# Patient Record
Sex: FEMALE | Race: WHITE
[De-identification: names, ages, dates, MRNs, and addresses within clinical notes are randomized per-mention and may not be internally consistent; named-entity substitution may affect disease eponyms.]

---

## 2021-05-25 ENCOUNTER — HOSPITAL ENCOUNTER (OUTPATIENT)
Dept: HOSPITAL 35 - MRI | Age: 46
End: 2021-05-25
Attending: FAMILY MEDICINE
Payer: COMMERCIAL

## 2021-05-25 DIAGNOSIS — M94.262: ICD-10-CM
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
## 2021-05-25 DIAGNOSIS — M25.78: ICD-10-CM

## 2021-05-25 DIAGNOSIS — S83.282A: Primary | ICD-10-CM

## 2021-05-25 DIAGNOSIS — M71.22: ICD-10-CM

## 2021-05-25 DIAGNOSIS — M25.462: ICD-10-CM
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
## 2021-10-13 ENCOUNTER — HOSPITAL ENCOUNTER (OUTPATIENT)
Dept: HOSPITAL 35 - BC | Age: 46
End: 2021-10-13
Attending: FAMILY MEDICINE
Payer: COMMERCIAL

## 2021-10-13 DIAGNOSIS — Z12.31: Primary | ICD-10-CM

## 2021-10-13 DIAGNOSIS — N64.89: ICD-10-CM

## 2021-10-19 ENCOUNTER — HOSPITAL ENCOUNTER (OUTPATIENT)
Dept: HOSPITAL 35 - RAD | Age: 46
End: 2021-10-19
Attending: FAMILY MEDICINE
Payer: COMMERCIAL

## 2021-10-19 DIAGNOSIS — R92.8: Primary | ICD-10-CM

## 2021-11-20 ENCOUNTER — HOSPITAL ENCOUNTER (OUTPATIENT)
Dept: HOSPITAL 35 - SLEEPLAB | Age: 46
End: 2021-11-20
Attending: INTERNAL MEDICINE
Payer: COMMERCIAL

## 2021-11-20 DIAGNOSIS — Z20.822: ICD-10-CM

## 2021-11-20 DIAGNOSIS — G47.30: Primary | ICD-10-CM

## 2021-11-23 NOTE — SLE
[facility]
[address] [place]   [address]                     POLYSOMNOGRAPHY STUDY         
_______________________________________________________________________________
 
Name:       MELONY FELIX           Room #:                     REG ASUNCION TAYLORMelinda#:      [id number]                       Account #:      [id number]  
Admission:  11/20/21    Attend Phys:    Bret Lester MD      
Discharge:              Date of Birth:  [birth year]  
                                                          Report #: [id number]
                                                                    [id number] 
_______________________________________________________________________________
THIS REPORT FOR:  
 
cc:  Troy Campos James A. DO Khan, Aman U. MD                                              ~
 
 
cc:     Josias Cantrell MD
DATE OF SERVICE: 11/20/2021
 
SLEEP STUDY
 
ATTENDING PHYSICIAN:  Dr. Josias Cantrell.
 
The patient is 46 years old who weighs 306 pounds with a BMI of 50.9.  The 
patient's [place] score was 11.  The patient underwent a diagnostic sleep study 
performed at [facility].
 
During the night study, the patient spent 424 minutes in bed and slept for 394 
minutes with a sleep efficiency of 93%.  Sleep latency was 1.4 minutes with a 
REM latency of 67 minutes.  Sleep architecture showed increased stage 1 and 
stage 2 sleep, normal slow wave and normal REM sleep.
 
During the night of the study, the patient had 1 obstructive apnea.  No mixed or
central apneas were seen.  The patient had 14 hypopneas.  The patient's AHI was 
2.3 per hour with a REM AHI of 5.9 per hour.  Supine AHI was 3.7 per hour.
 
EKG monitoring revealed an average heart rate of 78 beats per minute.  No 
arrhythmias observed.
 
PLMs were seen at an index of 12 per hour and 1 per hour caused EEG arousals.
 
Nocturnal oximetry study revealed an average oxygen saturation of 95% with a 
lowest of 89%.
 
Due to low AHI, the patient did not meet the split night criteria for CPAP 
initiation.
 
IMPRESSION:
1.  No clinically significant sleep disorder breathing.  The patient's AHI for 
the entire night was 2.3 per hour.
2.  No clinically significant nocturnal hypoxia.
3.  No clinically significant periodic limb movements.
 
RECOMMENDATIONS:
1.  The patient did not meet the split night criteria for CPAP initiation due to
 
 
 
[facility]
[address]
[place]   [address]                     POLYSOMNOGRAPHY STUDY         
_______________________________________________________________________________
 
Name:       MELONY FELIX           Room #:                     REG [facility]#:      [id number]                       Account #:      [id number]  
Admission:  11/20/21    Attend Phys:    Bret Lester MD      
Discharge:              Date of Birth:  [birth year]  
                                                          Report #: [id number]
                                                                    [id number] 
_______________________________________________________________________________
 
low AHI.
2.  Weight loss to the ideal body weight is recommended.
3.  Avoid CNS depressants.
4.  Cautioned regarding driving when sleepy or sleep deprived.
 
 
 
 
 
 
 
 
 
 
 
 
 
 
 
 
 
 
 
 
 
 
 
 
 
 
 
 
 
 
 
 
 
 
 
 
 
 
 
  <ELECTRONICALLY SIGNED>
   By: Bret Lester MD              
  11/23/21     1637
D: 11/22/21 1706                           _____________________________________
T: 11/22/21 2399                           Bret Lester MD                /nt